# Patient Record
Sex: FEMALE | Race: BLACK OR AFRICAN AMERICAN
[De-identification: names, ages, dates, MRNs, and addresses within clinical notes are randomized per-mention and may not be internally consistent; named-entity substitution may affect disease eponyms.]

---

## 2018-04-22 ENCOUNTER — HOSPITAL ENCOUNTER (EMERGENCY)
Dept: HOSPITAL 50 - VM.ED | Age: 38
Discharge: HOME | End: 2018-04-22
Payer: COMMERCIAL

## 2018-04-22 DIAGNOSIS — M54.5: Primary | ICD-10-CM

## 2018-04-22 DIAGNOSIS — Z88.0: ICD-10-CM

## 2018-04-22 DIAGNOSIS — Z88.8: ICD-10-CM

## 2018-04-22 RX ADMIN — HYDROCODONE BITARTRATE AND ACETAMINOPHEN ONE PACKET: 10; 325 TABLET ORAL at 09:59

## 2018-04-22 RX ADMIN — CYCLOBENZAPRINE HYDROCHLORIDE ONE PACKET: 10 TABLET, FILM COATED ORAL at 09:59

## 2018-04-22 NOTE — EDM.PDOC
ED HPI GENERAL MEDICAL PROBLEM





- General


Chief Complaint: Back Pain or Injury


Stated Complaint: BACK PAIN


Time Seen by Provider: 18 09:11


Source of Information: Reports: Patient


History Limitations: Reports: No Limitations





- History of Present Illness


INITIAL COMMENTS - FREE TEXT/NARRATIVE: 





Pt. presents to ER with complaints of low back pain and paresthesia in the back 

of her R thigh. Pt. states that she was lifting a heavy television set and 

twisted at the waist, injuring her back. She states that after she laid flat on 

her back, she began experiencing the paresthesia. She denies any saddle 

anesthesia. No incontinence. Denies any injury other than what was isolated to 

her back.


  ** Lower Back


Pain Score (Numeric/FACES): 9





- Related Data


 Allergies











Allergy/AdvReac Type Severity Reaction Status Date / Time


 


loratadine Allergy  Swelling Verified 16 16:20


 


Penicillins Allergy  Swelling Verified 16 16:20











Home Meds: 


 Home Meds





. [No Known Home Meds]  16 [History]











Past Medical History





- Past Health History


Medical/Surgical History: Denies Medical/Surgical History





- Past Surgical History


Female  Surgical History: Reports:  Section





Social & Family History





- Tobacco Use


Smoking Status *Q: Never Smoker





- Alcohol Use


Days Per Week of Alcohol Use: 3


Number of Drinks Per Day: 2


Total Drinks Per Week: 6





- Recreational Drug Use


Recreational Drug Use: No





ED ROS GENERAL





- Review of Systems


Review Of Systems: See Below


Constitutional: Reports: No Symptoms


HEENT: Reports: No Symptoms


Respiratory: Reports: No Symptoms


Cardiovascular: Reports: No Symptoms


Endocrine: Reports: No Symptoms


GI/Abdominal: Reports: No Symptoms


: Reports: No Symptoms


Musculoskeletal: Reports: Back Pain


Skin: Reports: No Symptoms


Neurological: Reports: No Symptoms, Paresthesia, Other (posterior R thigh)


Psychiatric: Reports: No Symptoms


Hematologic/Lymphatic: Reports: No Symptoms


Immunologic: Reports: No Symptoms





ED EXAM, GENERAL





- Physical Exam


Exam: See Below


Exam Limited By: No Limitations


General Appearance: Alert, WD/WN, No Apparent Distress


Ears: Normal External Exam, Normal Canal, Hearing Grossly Normal, Normal TMs


Ear Exam: Bilateral Ear: Auricle Normal, Canal Normal, TM normal


Nose: Normal Inspection, Normal Mucosa, No Blood


Respiratory/Chest: No Respiratory Distress, Lungs Clear, Normal Breath Sounds, 

No Accessory Muscle Use, Chest Non-Tender


Cardiovascular: Normal Peripheral Pulses, Regular Rate, Rhythm, No Edema, No 

Gallop, No JVD, No Murmur, No Rub


Back Exam: Normal Inspection, Decreased Range of Motion, Muscle Spasm, 

Paraspinal Tenderness, Vertebral Tenderness


Extremities: Normal Inspection, Normal Range of Motion, Non-Tender, Normal 

Capillary Refill, No Pedal Edema


Neurological: Alert, Oriented, CN II-XII Intact, Normal Cognition, Normal Gait, 

Normal Reflexes, No Motor/Sensory Deficits


Psychiatric: Normal Affect, Normal Mood


Skin Exam: Warm, Dry, Intact, Normal Color, No Rash





Course





- Vital Signs


Last Recorded V/S: 





 Last Vital Signs











Temp  36.6 C   18 09:11


 


Pulse  61   18 09:11


 


Resp  16   18 09:11


 


BP  117/76   18 09:11


 


Pulse Ox      














- Orders/Labs/Meds


Meds: 





Medications














Discontinued Medications














Generic Name Dose Route Start Last Admin





  Trade Name Tamar  PRN Reason Stop Dose Admin


 


Hydrocodone Bitart/Acetaminophen  1 packet  18 09:43  18 09:59





  Take Home: Acetaminophen/Hydrocodone 325-10mg  PO  18 09:44  1 packet





  ONETIME ONE   Administration





     





     





     





     


 


Cyclobenzaprine HCl  1 packet  18 09:44  18 09:59





  Take Home: Cyclobenzaprine 10 Mg, 4 Tab Pack  PO  18 09:45  1 packet





  ONETIME ONE   Administration





     





     





     





     


 


Prednisone  40 mg  18 09:44  18 09:59





  Prednisone  PO  18 09:45  40 mg





  ONETIME ONE   Administration





     





     





     





     














Departure





- Departure


Time of Disposition: 10:10


Disposition: Home, Self-Care 01


Clinical Impression: 


 Low back pain radiating to right lower extremity








- Discharge Information


Instructions:  Back Pain, Adult, Easy-to-Read, Low Back Strain Rehab[facility]


Referrals: 


PCP,None [Primary Care Provider] - 


Forms:  ED Department Discharge


Additional Instructions: 


Cyclobenzaprine 10mg three times daily as needed for muscle spasm





Norco 10/325mg 1 every 4-6 hours as needed for pain





Prednisone 40mg once daily until gone





Continue with ibuprofen 800mg every 8 hours as needed for pain





Do exercises as described.





Follow-up in clinic in 10 days for recheck. You will be contacted regarding 

physical therapy.

## 2019-09-30 ENCOUNTER — HOSPITAL ENCOUNTER (EMERGENCY)
Dept: HOSPITAL 50 - VM.ED | Age: 39
Discharge: HOME | End: 2019-09-30
Payer: SELF-PAY

## 2019-09-30 DIAGNOSIS — Z88.0: ICD-10-CM

## 2019-09-30 DIAGNOSIS — Z88.8: ICD-10-CM

## 2019-09-30 DIAGNOSIS — J20.9: Primary | ICD-10-CM

## 2019-09-30 NOTE — EDM.PDOC
ED HPI GENERAL MEDICAL PROBLEM





- General


Stated Complaint: SORE THROAT, CHEST CONJESTION


Time Seen by Provider: 19 11:00


Source of Information: Reports: Patient


History Limitations: Reports: No Limitations





- History of Present Illness


INITIAL COMMENTS - FREE TEXT/NARRATIVE: 





She started getting sick on Saturday. Her son was sick on Friday. She has had a 

productive cough. She has been having difficulty sleeping at night because of 

the cough. Sore throat but not sharp per se. Some chest discomfort but that is 

from the coughing. She did try to get into her primary she said today but was 

unable to. She does have allergies penicillin. I did see that she did have 

azithromycin in the past and she said that she had good results. I gave her a Z-

Keven as well as a refill. I also gave her Robitussin with codeine. I don't 

believe a chest x-ray is necessary. I don't believe lab work would be necessary 

either. She understood.


Quality: Reports: Ache


Severity: Moderate


Improves with: Reports: None


  ** Throat


Pain Score (Numeric/FACES): 7





  ** Chest


Pain Score (Numeric/FACES): 7





- Related Data


 Allergies











Allergy/AdvReac Type Severity Reaction Status Date / Time


 


loratadine Allergy  Swelling Verified 19 12:46


 


Penicillins Allergy  Swelling Verified 19 12:46











Home Meds: 


 Home Meds





Azithromycin 250 mg PO DAILY #6 tablet 19 [Rx]


Codeine/guaiFENesin [guaiFENesin-Codeine Syrup] 5 - 10 ml PO Q6H #30 ml  [Rx]











Past Medical History





- Past Health History


Medical/Surgical History: Denies Medical/Surgical History





- Past Surgical History


Female  Surgical History: Reports:  Section





ED ROS GENERAL





- Review of Systems


Review Of Systems: ROS reveals no pertinent complaints other than HPI.





ED EXAM, GENERAL





- Physical Exam


Exam: See Below


Exam Limited By: No Limitations


General Appearance: Alert, Moderate Distress


Nose: Normal Inspection, Normal Mucosa


Throat/Mouth: Normal Inspection, Normal Lips, Normal Teeth, Normal Gums, Normal 

Oropharynx


Head: Atraumatic, Normocephalic


Neck: Normal Inspection, Supple, Non-Tender, Full Range of Motion


Respiratory/Chest: No Respiratory Distress, Lungs Clear, Normal Breath Sounds, 

No Accessory Muscle Use, Chest Non-Tender


Cardiovascular: Normal Peripheral Pulses, Regular Rate, Rhythm, No Edema, No 

Gallop, No JVD, No Murmur, No Rub


Psychiatric: Normal Affect


Skin Exam: Warm, Dry


Lymphatic: No Adenopathy





Course





- Vital Signs


Last Recorded V/S: 


 Last Vital Signs











Temp  36.6 C   19 10:40


 


Pulse  67   19 10:40


 


Resp  16   19 10:40


 


BP  123/66   19 10:40


 


Pulse Ox  99   19 10:40














Departure





- Departure


Time of Disposition: 11:13


Disposition: Home, Self-Care 01


Condition: Good


Clinical Impression: 


Acute bronchitis


Qualifiers:


 Bronchitis organism: unspecified organism Qualified Code(s): J20.9 - Acute 

bronchitis, unspecified








- Discharge Information


*PRESCRIPTION DRUG MONITORING PROGRAM REVIEWED*: Not Applicable


*COPY OF PRESCRIPTION DRUG MONITORING REPORT IN PATIENT [place]: Not Applicable


Prescriptions: 


Azithromycin 250 mg PO DAILY #6 tablet


Codeine/guaiFENesin [guaiFENesin-Codeine Syrup] 5 - 10 ml PO Q6H #30 ml


Referrals: 


Diamond Pemberton PA-C [Primary Care Provider] - 


Forms:  ED Return to Work/School Form


Additional Instructions: 


Refill the z-pack if not quite improved after the 5 days. I don't believe a x-

ray would change the treatment.
Fall

## 2021-05-09 ENCOUNTER — HOSPITAL ENCOUNTER (EMERGENCY)
Dept: HOSPITAL 50 - VM.ED | Age: 41
Discharge: HOME | End: 2021-05-09
Payer: COMMERCIAL

## 2021-05-09 DIAGNOSIS — Z88.5: ICD-10-CM

## 2021-05-09 DIAGNOSIS — U07.1: Primary | ICD-10-CM

## 2021-05-09 DIAGNOSIS — J12.82: ICD-10-CM

## 2021-05-09 DIAGNOSIS — Z88.0: ICD-10-CM

## 2021-05-09 LAB
ANION GAP SERPL CALC-SCNC: 13.5 MMOL/L (ref 5–15)
APTT PPP: 31.9 SEC (ref 25.6–32.8)
CHLORIDE SERPL-SCNC: 100 MMOL/L (ref 98–107)
SODIUM SERPL-SCNC: 136 MMOL/L (ref 136–145)

## 2021-05-09 PROCEDURE — M0245: HCPCS

## 2021-05-09 PROCEDURE — U0002 COVID-19 LAB TEST NON-CDC: HCPCS

## 2021-05-09 NOTE — CR
______________________________________________________________________________   

  

[id number] RAD/RAD Chest PA And Lateral  

EXAM: FRONTAL AND LATERAL CHEST  

   

 INDICATION: HX OF BRONCHITIS  

   

 COMPARISON: March 12, 2016.  

   

 DISCUSSION: Evaluation mildly limited by low lung volumes. Mild to moderate  

 bibasilar infiltrates, right greater than left. The heart is at upper limits of  

 normal for size. No effusions.  

   

 IMPRESSION:    

 1.  Mild to moderate bibasilar infiltrates.  

   

 Electronically signed by Matt Oneal MD on 5/9/2021 11:18 AM  

   

  

Matt Oneal MD                 

 05/09/21 1120    

  

Thank you for allowing us to participate in the care of your patient.

## 2021-05-09 NOTE — EDM.PDOC
ED HPI GENERAL MEDICAL PROBLEM





- General


Chief Complaint: Respiratory Problem


Stated Complaint: SOB,COUGHING


Time Seen by Provider: 21 10:00


Source of Information: Reports: Patient


History Limitations: Reports: No Limitations





- History of Present Illness


INITIAL COMMENTS - FREE TEXT/NARRATIVE: 





Pt. presents to ER with complaints of cough, chest congestion, respirophasic ch

est pain and shortness of breath. Pt. denies any fever or chills. No nausea, 

vomiting, or diarrhea. Pt. states that she often has problems with bronchitis, 

particularly in the spring since moving to [place]. She has been seen in 

this facility in the past for bronchitis/reactive airway in the past. Pt. is a 

non-smoker and has never smoked.


Pt. states that the symptoms started on Tuesday, and she states that she is 

feeling a bit better today than she did earlier in the week. She is concerned 

that she hasn't improved more, however.


Pt. denies any exposure to patients positive for coronavirus. 


Pt. denies any jaw, arm, neck or back pain. Denies any formal diagnosis of 

asthma. No history of CAD, hypertension. Denies any peripheral edema. No history

of dyslipidemia. 


She denies any hemoptysis. No calf pain or extremity pallor/duskiness.


Onset: Today


Onset Date: 21


Location: Reports: Chest, Generalized


Improves with: Reports: Rest


Worsens with: Reports: Breathing


  ** Thoracic


Pain Score (Numeric/FACES): 8





- Related Data


                                    Allergies











Allergy/AdvReac Type Severity Reaction Status Date / Time


 


loratadine Allergy  Swelling Verified 21 10:23


 


Penicillins Allergy  Swelling Verified 21 10:23











Home Meds: 


                                    Home Meds





. [No Known Home Meds]  21 [History]











Past Medical History





- Past Health History


Medical/Surgical History: Denies Medical/Surgical History





- Infectious Disease History


Infectious Disease History: Reports: Novel Coronavirus





- Past Surgical History


Female  Surgical History: Reports:  Section





Social & Family History





- Tobacco Use


Tobacco Use Status *Q: Never Tobacco User





ED ROS GENERAL





- Review of Systems


Review Of Systems: See Below


Constitutional: Reports: No Symptoms


HEENT: Reports: No Symptoms


Respiratory: Reports: Shortness of Breath, Wheezing, Cough


Cardiovascular: Reports: Chest Pain


Endocrine: Reports: No Symptoms


GI/Abdominal: Reports: No Symptoms


: Reports: No Symptoms


Musculoskeletal: Reports: No Symptoms


Skin: Reports: No Symptoms


Neurological: Reports: No Symptoms


Psychiatric: Reports: No Symptoms


Hematologic/Lymphatic: Reports: No Symptoms


Immunologic: Reports: No Symptoms





ED EXAM, GENERAL





- Physical Exam


Exam: See Below


Exam Limited By: No Limitations


General Appearance: Alert, WD/WN, Mild Distress


Eye Exam: Bilateral Eye: EOMI, PERRL


Head: Atraumatic, Normocephalic


Neck: Normal Inspection, Supple, Non-Tender, Full Range of Motion


Respiratory/Chest: No Respiratory Distress, No Accessory Muscle Use, Chest Non-

Tender, Wheezing


Cardiovascular: Normal Peripheral Pulses, Regular Rate, Rhythm, No Edema, No 

JVD, No Murmur


GI/Abdominal: Soft, Non-Tender, No Distention, No Mass


 (Female) Exam: Deferred


Rectal (Female) Exam: Deferred


Back Exam: Normal Inspection, Full Range of Motion


Extremities: Normal Inspection, Normal Range of Motion, Non-Tender, No Pedal 

Edema, Normal Capillary Refill


Neurological: Alert, Oriented, CN II-XII Intact, Normal Cognition, Normal 

Reflexes, No Motor/Sensory Deficits


Psychiatric: Normal Affect, Normal Mood


Skin Exam: Warm, Dry, Intact, Normal Color, No Rash


Lymphatic: No Adenopathy


  ** #1 Interpretation


Rhythm: NSR


Axis: Normal


P-Wave: Present


QRS: Normal


ST-T: Normal


QT: Normal





Course





- Vital Signs


Last Recorded V/S: 


                                Last Vital Signs











Temp  36.7 C   21 13:17


 


Pulse  81   21 13:17


 


Resp  18   21 13:17


 


BP  140/89   21 13:17


 


Pulse Ox  98   21 13:17














- Orders/Labs/Meds


Orders: 


                               Active Orders 24 hr











 Category Date Time Status


 


 EKG Documentation Completion [RC] STAT Care  21 11:11 Active


 


 Nurse Communication: Isolation [RC] ASDIRECTED Care  21 11:10 Active


 


 RT Aerosol Therapy [RC] ASDIRECTED Care  21 10:20 Active


 


 Vital Signs [RC] Q15M Care  21 11:17 Active


 


 CULTURE BLOOD [BC] Stat Lab  21 11:40 Received


 


 CULTURE BLOOD [BC] Stat Lab  21 11:49 Received


 


 PROCALCITONIN [REF] Stat Lab  21 11:40 Received


 


 Albuterol [Take Home: Albuterol 18 GM, 1 INH Pack] Med  21 11:00 Active





 1 packet INH Q4H PRN   


 


 EPINEPHrine [Adrenalin] Med  21 11:17 Active





 0.3 mg IM ONETIME PRN   


 


 Famotidine [Pepcid] Med  21 11:17 Active





 20 mg IVPUSH ONETIME PRN   


 


 Sodium Chloride 0.9% [Saline Flush] Med  21 11:14 Active





 10 ml FLUSH ASDIRECTED PRN   


 


 Sodium Chloride 0.9% [Saline Flush] Med  21 11:30 Active





 30 ml FLUSH ASDIRECTED   


 


 dexAMETHasone [Decadron] Med  21 11:15 Active





 6 mg IVPUSH DAILY   


 


 diphenhydrAMINE [Benadryl] Med  21 11:17 Active





 50 mg IVPUSH ONETIME PRN   


 


 methylPREDNISolone Sod Succ [Solu-MEDROL] Med  21 11:17 Active





 125 mg IVPUSH ONETIME PRN   


 


 Blood Culture x2 Reflex Set [OM.PC] Stat Oth  21 11:27 Ordered


 


 Isolation [COMM] Stat Oth  21 11:10 Ordered


 


 Peripheral IV Insertion Adult [OM.PC] Routine Oth  21 11:14 Ordered








                                Medication Orders





Albuterol (Take Home: Albuterol 18 Gm Inhaler, 1 Inhaler Pack)  1 packet INH Q4H

 PRN


   PRN Reason: Shortness of Breath


Dexamethasone (Dexamethasone 4 Mg/Ml Sdv)  6 mg IVPUSH DAILY PIO


   Stop: 21 08:01


   Last Admin: 21 11:37  Dose: 6 mg


   Documented by: GUY


Diphenhydramine HCl (Diphenhydramine 50 Mg/Ml Sdv)  50 mg IVPUSH ONETIME PRN


   PRN Reason: hypersensitivity reaction


Epinephrine HCl (Epinephrine 1 Mg/Ml Sdv)  0.3 mg IM ONETIME PRN


   PRN Reason: hypersensitivity reaction


Famotidine (Famotidine 20 Mg/2 Ml Sdv)  20 mg IVPUSH ONETIME PRN


   PRN Reason: hypersensitivity reaction


Methylprednisolone Sodium Succinate (Methylprednisolone Sodium Succinate 125 

Mg/2 Ml Sdv)  125 mg IVPUSH ONETIME PRN


   PRN Reason: hypersensitivity reaction


Sodium Chloride (Sodium Chloride 0.9% 10 Ml Syringe)  10 ml FLUSH ASDIRECTED PRN


   PRN Reason: Keep Vein Open


Sodium Chloride (Sodium Chloride 0.9% 10 Ml Syringe)  30 ml FLUSH ASDIRECTED PIO








Labs: 


                                Laboratory Tests











  21 Range/Units





  10:19 11:35 11:35 


 


WBC     (4.0-10.0)  x10^3/uL


 


RBC     (4.00-5.50)  x10^6/uL


 


Hgb     (12.0-16.0)  g/dL


 


Hct     (33.0-47.0)  %


 


MCV     (78.0-93.0)  fL


 


MCH     (26.0-32.0)  pg


 


MCHC     (32.0-36.0)  g/dL


 


RDW Coeff of Mike     (10.0-15.0)  %


 


Plt Count     (130-400)  x10^3/uL


 


Neut % (Auto)     (50.0-80.0)  %


 


Lymph % (Auto)     (25.0-50.0)  %


 


Mono % (Auto)     (2.0-11.0)  %


 


Eos % (Auto)     (0.0-4.0)  %


 


Baso % (Auto)     (0.2-1.2)  %


 


PT     (9.9-12.5)  SEC


 


INR     (2.0-3.5)  


 


APTT     (25.6-32.8)  SEC


 


D-Dimer, Quantitative     (<=0.58)  mg/LFEU


 


Sodium   136   (136-145)  mmol/L


 


Potassium   3.5   (3.5-5.1)  mmol/L


 


Chloride   100   ()  mmol/L


 


Carbon Dioxide   26   (21-32)  mmol/L


 


Anion Gap   13.5   (5-15)  mmol/L


 


BUN   10   (7-18)  mg/dL


 


Creatinine   1.0   (0.55-1.02)  mg/dL


 


Est Cr Clr Drug Dosing   TNP   


 


Estimated GFR (MDRD)   > 60   


 


Glucose   99   (70-99)  mg/dL


 


Lactic Acid    0.9  (0.4-2.0)  mmol/L


 


Calcium   8.6   (8.5-10.1)  mg/dL


 


Ferritin     (8-252)  ng/mL


 


Total Bilirubin   0.3   (0.2-1.0)  mg/dL


 


Direct Bilirubin   0.09   (0.00-0.20)  mg/dL


 


Indirect Bilirubin   0.21   


 


AST   61 H   (15-37)  U/L


 


ALT   51   (14-59)  U/L


 


Alkaline Phosphatase   88   ()  U/L


 


Lactate Dehydrogenase   294 H   ()  U/L


 


Creatine Kinase   351 H*   ()  U/L


 


Total Protein   8.1   (6.4-8.2)  g/dL


 


Albumin   3.4   (3.4-5.0)  g/dL


 


Globulin   4.7   


 


Albumin/Globulin Ratio   0.72   


 


SARS-CoV-2 RNA (PAMELA)  Positive H    (NEGATIVE)  














  21 Range/Units





  11:35 11:35 11:35 


 


WBC    2.7 L  (4.0-10.0)  x10^3/uL


 


RBC    4.33  (4.00-5.50)  x10^6/uL


 


Hgb    13.0  (12.0-16.0)  g/dL


 


Hct    38.5  (33.0-47.0)  %


 


MCV    88.9  (78.0-93.0)  fL


 


MCH    30.0  (26.0-32.0)  pg


 


MCHC    33.8  (32.0-36.0)  g/dL


 


RDW Coeff of Mike    14.1  (10.0-15.0)  %


 


Plt Count    141  (130-400)  x10^3/uL


 


Neut % (Auto)    62.0  (50.0-80.0)  %


 


Lymph % (Auto)    28.6  (25.0-50.0)  %


 


Mono % (Auto)    9.4  (2.0-11.0)  %


 


Eos % (Auto)    0.0  (0.0-4.0)  %


 


Baso % (Auto)    0.0 L  (0.2-1.2)  %


 


PT  10.2    (9.9-12.5)  SEC


 


INR  0.9 L    (2.0-3.5)  


 


APTT  31.9    (25.6-32.8)  SEC


 


D-Dimer, Quantitative     (<=0.58)  mg/LFEU


 


Sodium     (136-145)  mmol/L


 


Potassium     (3.5-5.1)  mmol/L


 


Chloride     ()  mmol/L


 


Carbon Dioxide     (21-32)  mmol/L


 


Anion Gap     (5-15)  mmol/L


 


BUN     (7-18)  mg/dL


 


Creatinine     (0.55-1.02)  mg/dL


 


Est Cr Clr Drug Dosing     


 


Estimated GFR (MDRD)     


 


Glucose     (70-99)  mg/dL


 


Lactic Acid     (0.4-2.0)  mmol/L


 


Calcium     (8.5-10.1)  mg/dL


 


Ferritin   116   (8-252)  ng/mL


 


Total Bilirubin     (0.2-1.0)  mg/dL


 


Direct Bilirubin     (0.00-0.20)  mg/dL


 


Indirect Bilirubin     


 


AST     (15-37)  U/L


 


ALT     (14-59)  U/L


 


Alkaline Phosphatase     ()  U/L


 


Lactate Dehydrogenase     ()  U/L


 


Creatine Kinase     ()  U/L


 


Total Protein     (6.4-8.2)  g/dL


 


Albumin     (3.4-5.0)  g/dL


 


Globulin     


 


Albumin/Globulin Ratio     


 


SARS-CoV-2 RNA (PAMELA)     (NEGATIVE)  














  21 Range/Units





  11:40 


 


WBC   (4.0-10.0)  x10^3/uL


 


RBC   (4.00-5.50)  x10^6/uL


 


Hgb   (12.0-16.0)  g/dL


 


Hct   (33.0-47.0)  %


 


MCV   (78.0-93.0)  fL


 


MCH   (26.0-32.0)  pg


 


MCHC   (32.0-36.0)  g/dL


 


RDW Coeff of Mike   (10.0-15.0)  %


 


Plt Count   (130-400)  x10^3/uL


 


Neut % (Auto)   (50.0-80.0)  %


 


Lymph % (Auto)   (25.0-50.0)  %


 


Mono % (Auto)   (2.0-11.0)  %


 


Eos % (Auto)   (0.0-4.0)  %


 


Baso % (Auto)   (0.2-1.2)  %


 


PT   (9.9-12.5)  SEC


 


INR   (2.0-3.5)  


 


APTT   (25.6-32.8)  SEC


 


D-Dimer, Quantitative  0.76 H  (<=0.58)  mg/LFEU


 


Sodium   (136-145)  mmol/L


 


Potassium   (3.5-5.1)  mmol/L


 


Chloride   ()  mmol/L


 


Carbon Dioxide   (21-32)  mmol/L


 


Anion Gap   (5-15)  mmol/L


 


BUN   (7-18)  mg/dL


 


Creatinine   (0.55-1.02)  mg/dL


 


Est Cr Clr Drug Dosing   


 


Estimated GFR (MDRD)   


 


Glucose   (70-99)  mg/dL


 


Lactic Acid   (0.4-2.0)  mmol/L


 


Calcium   (8.5-10.1)  mg/dL


 


Ferritin   (8-252)  ng/mL


 


Total Bilirubin   (0.2-1.0)  mg/dL


 


Direct Bilirubin   (0.00-0.20)  mg/dL


 


Indirect Bilirubin   


 


AST   (15-37)  U/L


 


ALT   (14-59)  U/L


 


Alkaline Phosphatase   ()  U/L


 


Lactate Dehydrogenase   ()  U/L


 


Creatine Kinase   ()  U/L


 


Total Protein   (6.4-8.2)  g/dL


 


Albumin   (3.4-5.0)  g/dL


 


Globulin   


 


Albumin/Globulin Ratio   


 


SARS-CoV-2 RNA (PAMELA)   (NEGATIVE)  











Meds: 


Medications











Generic Name Dose Route Start Last Admin





  Trade Name Freq  PRN Reason Stop Dose Admin


 


Albuterol  1 packet  21 11:00 





  Take Home: Albuterol 18 Gm Inhaler, 1 Inhaler Pack  INH  





  Q4H PRN  





  Shortness of Breath  


 


Dexamethasone  6 mg  21 11:15  21 11:37





  Dexamethasone 4 Mg/Ml Sdv  IVPUSH  21 08:01  6 mg





  DAILY PIO   Administration


 


Diphenhydramine HCl  50 mg  21 11:17 





  Diphenhydramine 50 Mg/Ml Sdv  IVPUSH  





  ONETIME PRN  





  hypersensitivity reaction  


 


Epinephrine HCl  0.3 mg  21 11:17 





  Epinephrine 1 Mg/Ml Sdv  IM  





  ONETIME PRN  





  hypersensitivity reaction  


 


Famotidine  20 mg  21 11:17 





  Famotidine 20 Mg/2 Ml Sdv  IVPUSH  





  ONETIME PRN  





  hypersensitivity reaction  


 


Methylprednisolone Sodium Succinate  125 mg  21 11:17 





  Methylprednisolone Sodium Succinate 125 Mg/2 Ml Sdv  IVPUSH  





  ONETIME PRN  





  hypersensitivity reaction  


 


Sodium Chloride  10 ml  21 11:14 





  Sodium Chloride 0.9% 10 Ml Syringe  FLUSH  





  ASDIRECTED PRN  





  Keep Vein Open  


 


Sodium Chloride  30 ml  21 11:30 





  Sodium Chloride 0.9% 10 Ml Syringe  FLUSH  





  ASDIRECTED PIO  














Discontinued Medications














Generic Name Dose Route Start Last Admin





  Trade Name Freq  PRN Reason Stop Dose Admin


 


Albuterol/Ipratropium  3 ml  21 10:20  21 10:27





  Albuterol/Ipratropium 3.0-0.5 Mg/3 Ml Neb Soln  NEB  21 10:21  3 ml





  ONETIME ONE   Administration


 


Doxycycline Monohydrate  1 packet  21 11:00 





  Take Home: Doxycycline 100 Mg Tab, 4 Tab Pack  PO  21 11:01 





  ONETIME ONE  


 


Bamlanivimab 700 mg/  310 mls @ 310 mls/hr  21 11:17  21 11:38





Etesevimab 1,400 mg/ Sodium  IV  21 12:16  310 mls/hr





Chloride  ONETIME ONE   Administration


 


Iopamidol  100 ml  21 13:18  21 14:20





  Iopamidol 612 Mg/Ml 100 Ml Bottle  IVPUSH  21 13:19  100 ml





  ONETIME ONE   Administration


 


Prednisone  1 packet  21 11:00 





  Take Home: Prednisone 20 Mg, 2 Tab Pack  PO  21 11:01 





  ONETIME ONE  


 


Promethazine HCl/Codeine  2 packet  21 14:47 





  Take Home: Codeine/Promethazine 10-6.25 Mg/5 Ml Syrup 5 Ml, 2 Cup Pack  PO  

21 14:48 





  ONETIME ONE  














- Radiology Interpretation


Free Text/Narrative:: 





bibasilar infiltrates noted on the chest x-ray.





CTA of the chest was obtained due to symptoms, elevated d dimer. No obvious PE 

was seen, but the quality of the study was poor. Radiologist states there was no

 obvious PE identified, but contrast opacification was insufficient for full 

embolus identification.





Departure





- Departure


Time of Disposition: 15:05


Disposition: Home, Self-Care 01


Clinical Impression: 


 COVID-19, Pneumonia








- Discharge Information


Instructions:  COVID-19, COVID-19: Quarantine vs. Isolation - CDC


Referrals: 


Diamond Pemberton PA-C [Primary Care Provider] - 


Forms:  ED Department Discharge


Additional Instructions: 


Quarantine at home. [place] Dept. of health will be in contact with you regarding how

long you need to do this.





Doxycycline 100mg


1 twice daily for 10 days





Prednisone 20mg


2 tabs daily until gone





Phenergan with codeine cough medicine


1 tsp every 4-6 hours if needed for cough.





Albuterol inhaler


2 puffs every 4-6 hours as needed for cough/breathing trouble.





Return to ER if you have a harder time breathing.





Contact the ER if you have any questions. My cell phone number is [phone] 

also if you have any questions.











Sepsis Event Note (ED)





- Evaluation


Sepsis Screening Result: No Definite Risk





- Focused Exam


Vital Signs: 


                                   Vital Signs











  Temp Pulse Resp BP Pulse Ox


 


 21 13:17  36.7 C  81  18  140/89  98


 


 21 13:02  36.7 C  84  14  137/74  97


 


 21 12:47   80  14  139/79  95


 


 21 12:32   80  16  127/77  98


 


 21 12:17  36.9 C  83  18  131/82  98


 


 21 12:02   98  16  134/77  98


 


 21 11:45  36.6 C  83  18  125/68  98


 


 21 10:00  36.8 C  85  16  113/59 L  97














- Problem List Review


Problem List Initiated/Reviewed/Updated: Yes





- My Orders


Last 24 Hours: 


My Active Orders





21 10:20


RT Aerosol Therapy [RC] ASDIRECTED 





21 11:00


Albuterol [Take Home: Albuterol 18 GM, 1 INH Pack]   1 packet INH Q4H PRN 





21 11:10


Nurse Communication: Isolation [RC] ASDIRECTED 


Isolation [COMM] Stat 





21 11:11


EKG Documentation Completion [RC] STAT 





21 11:14


Sodium Chloride 0.9% [Saline Flush]   10 ml FLUSH ASDIRECTED PRN 


Peripheral IV Insertion Adult [OM.PC] Routine 





21 11:15


dexAMETHasone [Decadron]   6 mg IVPUSH DAILY 





21 11:17


Vital Signs [RC] Q15M 


EPINEPHrine [Adrenalin]   0.3 mg IM ONETIME PRN 


Famotidine [Pepcid]   20 mg IVPUSH ONETIME PRN 


diphenhydrAMINE [Benadryl]   50 mg IVPUSH ONETIME PRN 


methylPREDNISolone Sod Succ [Solu-MEDROL]   125 mg IVPUSH ONETIME PRN 





21 11:27


Blood Culture x2 Reflex Set [OM.PC] Stat 





21 11:30


Sodium Chloride 0.9% [Saline Flush]   30 ml FLUSH ASDIRECTED 





21 11:40


CULTURE BLOOD [BC] Stat 


PROCALCITONIN [REF] Stat 





21 11:49


CULTURE BLOOD [BC] Stat 














- Assessment/Plan


Last 24 Hours: 


My Active Orders





21 10:20


RT Aerosol Therapy [RC] ASDIRECTED 





21 11:00


Albuterol [Take Home: Albuterol 18 GM, 1 INH Pack]   1 packet INH Q4H PRN 





21 11:10


Nurse Communication: Isolation [RC] ASDIRECTED 


Isolation [COMM] Stat 





21 11:11


EKG Documentation Completion [RC] STAT 





21 11:14


Sodium Chloride 0.9% [Saline Flush]   10 ml FLUSH ASDIRECTED PRN 


Peripheral IV Insertion Adult [OM.PC] Routine 





21 11:15


dexAMETHasone [Decadron]   6 mg IVPUSH DAILY 





21 11:17


Vital Signs [RC] Q15M 


EPINEPHrine [Adrenalin]   0.3 mg IM ONETIME PRN 


Famotidine [Pepcid]   20 mg IVPUSH ONETIME PRN 


diphenhydrAMINE [Benadryl]   50 mg IVPUSH ONETIME PRN 


methylPREDNISolone Sod Succ [Solu-MEDROL]   125 mg IVPUSH ONETIME PRN 





21 11:27


Blood Culture x2 Reflex Set [OM.PC] Stat 





21 11:30


Sodium Chloride 0.9% [Saline Flush]   30 ml FLUSH ASDIRECTED 





21 11:40


CULTURE BLOOD [BC] Stat 


PROCALCITONIN [REF] Stat 





21 11:49


CULTURE BLOOD [BC] Stat 











Plan: 


Pt. states that she is feeling better after the nebulizer treatment. She 

tolerated the monoclonal antibiotic regimen well. Pt. was hemodynamically stable

 and not obviously dyspneic on discharge, and appeared to be having improved 

breathing.


Unfortunately there were complications during the contrast injection phase of 

her CTA. No obvious PE was noted, but smaller PE could not be excluded. 

Discussed findings with patient. She is comfortable with being discharged and 

returning if the symptoms worsen, if she has increased shortness of breath, 

hemoptysis, if she feels faint, or if she has concerns. I think the risks of 

redoing the CTA at this point outweigh the benefits. She would be a great risk 

for contrast nephropathy. Clinically she has no evidence of PE other than mildly

 elevated d dimer and shortness of breath that has improved during her treatment

 in ER. She lives with her  who will be quarantined with the patient.


Pt. will be started on doxycycline 100mg twice daily for 10 days. Her chest 

radiographs showed some more consolidation in the bases as well as the ground 

glass pattern seen with covid 19. I am concerned she may be developing a 

bacterial co-infection.


 She was also started on prednisone 40mg daily for a week. Pt. was also given an

 albuterol inhaler as well and phenergan with codeine for cough.


Pt. was advised to return to the ER if she is having worsening symptoms. She was

 urged to contact the ER at any time if she has questions. She was also given my

 personal cell phone number to call for advice as well.

## 2022-07-18 ENCOUNTER — HOSPITAL ENCOUNTER (EMERGENCY)
Dept: HOSPITAL 52 - LL.ED | Age: 42
Discharge: HOME | End: 2022-07-18
Payer: COMMERCIAL

## 2022-07-18 DIAGNOSIS — Z88.8: ICD-10-CM

## 2022-07-18 DIAGNOSIS — Z86.16: ICD-10-CM

## 2022-07-18 DIAGNOSIS — E66.9: ICD-10-CM

## 2022-07-18 DIAGNOSIS — Z88.0: ICD-10-CM

## 2022-07-18 DIAGNOSIS — M54.6: Primary | ICD-10-CM

## 2023-05-24 ENCOUNTER — HOSPITAL ENCOUNTER (EMERGENCY)
Dept: HOSPITAL 50 - VM.ED | Age: 43
Discharge: HOME | End: 2023-05-24
Payer: MEDICAID

## 2023-05-24 DIAGNOSIS — Z98.890: ICD-10-CM

## 2023-05-24 DIAGNOSIS — Z86.16: ICD-10-CM

## 2023-05-24 DIAGNOSIS — Z88.8: ICD-10-CM

## 2023-05-24 DIAGNOSIS — Z20.822: ICD-10-CM

## 2023-05-24 DIAGNOSIS — J40: Primary | ICD-10-CM

## 2023-05-24 DIAGNOSIS — Z79.899: ICD-10-CM

## 2023-05-24 DIAGNOSIS — Z88.0: ICD-10-CM

## 2023-05-24 DIAGNOSIS — E66.9: ICD-10-CM

## 2023-05-24 LAB
FLUAV RNA UPPER RESP QL NAA+PROBE: NEGATIVE
FLUBV RNA UPPER RESP QL NAA+PROBE: NEGATIVE
RSV RNA UPPER RESP QL NAA+PROBE: NEGATIVE
SARS-COV-2 RNA RESP QL NAA+PROBE: NEGATIVE

## 2024-01-15 ENCOUNTER — HOSPITAL ENCOUNTER (EMERGENCY)
Dept: HOSPITAL 52 - LL.ED | Age: 44
Discharge: HOME | End: 2024-01-15
Payer: COMMERCIAL

## 2024-01-15 DIAGNOSIS — Z88.0: ICD-10-CM

## 2024-01-15 DIAGNOSIS — Z79.899: ICD-10-CM

## 2024-01-15 DIAGNOSIS — Z88.8: ICD-10-CM

## 2024-01-15 DIAGNOSIS — E66.9: ICD-10-CM

## 2024-01-15 DIAGNOSIS — Z86.16: ICD-10-CM

## 2024-01-15 DIAGNOSIS — M54.6: Primary | ICD-10-CM

## 2024-10-18 ENCOUNTER — HOSPITAL ENCOUNTER (EMERGENCY)
Dept: HOSPITAL 52 - LL.ED | Age: 44
Discharge: HOME | End: 2024-10-18
Payer: SELF-PAY

## 2024-10-18 DIAGNOSIS — Z88.8: ICD-10-CM

## 2024-10-18 DIAGNOSIS — Z86.16: ICD-10-CM

## 2024-10-18 DIAGNOSIS — Z79.899: ICD-10-CM

## 2024-10-18 DIAGNOSIS — Z88.0: ICD-10-CM

## 2024-10-18 DIAGNOSIS — M54.50: Primary | ICD-10-CM

## 2024-10-18 DIAGNOSIS — E66.9: ICD-10-CM

## 2024-10-18 RX ADMIN — KETOROLAC TROMETHAMINE ONE PACKET: 10 TABLET, FILM COATED ORAL at 01:31

## 2024-10-18 RX ADMIN — CYCLOBENZAPRINE HYDROCHLORIDE ONE PACKET: 10 TABLET, FILM COATED ORAL at 01:31
